# Patient Record
Sex: MALE | Race: WHITE | NOT HISPANIC OR LATINO | URBAN - METROPOLITAN AREA
[De-identification: names, ages, dates, MRNs, and addresses within clinical notes are randomized per-mention and may not be internally consistent; named-entity substitution may affect disease eponyms.]

---

## 2023-04-08 ENCOUNTER — NURSING HOME VISIT (OUTPATIENT)
Dept: POST ACUTE CARE | Facility: EXTERNAL LOCATION | Age: 33
End: 2023-04-08
Payer: COMMERCIAL

## 2023-04-08 DIAGNOSIS — E78.2 DM TYPE 2 WITH DIABETIC MIXED HYPERLIPIDEMIA (MULTI): ICD-10-CM

## 2023-04-08 DIAGNOSIS — N39.0 URINARY TRACT INFECTION ASSOCIATED WITH CATHETERIZATION OF URINARY TRACT, UNSPECIFIED INDWELLING URINARY CATHETER TYPE, SUBSEQUENT ENCOUNTER: ICD-10-CM

## 2023-04-08 DIAGNOSIS — G82.20 PARAPLEGIA (MULTI): Primary | ICD-10-CM

## 2023-04-08 DIAGNOSIS — T83.511D URINARY TRACT INFECTION ASSOCIATED WITH CATHETERIZATION OF URINARY TRACT, UNSPECIFIED INDWELLING URINARY CATHETER TYPE, SUBSEQUENT ENCOUNTER: ICD-10-CM

## 2023-04-08 DIAGNOSIS — I10 DIABETES MELLITUS WITH COINCIDENT HYPERTENSION (MULTI): ICD-10-CM

## 2023-04-08 DIAGNOSIS — K21.9 GERD WITHOUT ESOPHAGITIS: ICD-10-CM

## 2023-04-08 DIAGNOSIS — E11.9 DIABETES MELLITUS WITH COINCIDENT HYPERTENSION (MULTI): ICD-10-CM

## 2023-04-08 DIAGNOSIS — E11.49 DIABETES MELLITUS TYPE 2 WITH NEUROLOGICAL MANIFESTATIONS (MULTI): ICD-10-CM

## 2023-04-08 DIAGNOSIS — F33.41 RECURRENT MAJOR DEPRESSIVE DISORDER, IN PARTIAL REMISSION (CMS-HCC): ICD-10-CM

## 2023-04-08 DIAGNOSIS — E11.69 DM TYPE 2 WITH DIABETIC MIXED HYPERLIPIDEMIA (MULTI): ICD-10-CM

## 2023-04-08 DIAGNOSIS — S24.103S: ICD-10-CM

## 2023-04-08 PROBLEM — T83.511A CATHETER-ASSOCIATED URINARY TRACT INFECTION (CMS-HCC): Status: ACTIVE | Noted: 2023-04-08

## 2023-04-08 PROBLEM — S24.103A: Status: ACTIVE | Noted: 2023-04-08

## 2023-04-08 PROCEDURE — 99309 SBSQ NF CARE MODERATE MDM 30: CPT | Performed by: INTERNAL MEDICINE

## 2023-04-08 NOTE — ASSESSMENT & PLAN NOTE
Diabetes is chronic disease, it does not get cured but it can be controlled, in modern medicine there are variety of measures taken to control DM. Usually we want to preserve beta cell functions. Please understand the disease and how our life style can affect control of glycemia. Diabetes leads to macro and microvascular complications and they could be devastating. It is important to have annual eye check and frequent foot inspection and foot inspection. Kidney dysfunction including dialysis, foot amputations, neuropathy, foot ulcers and accelerated atherosclerotic vascular disease are known complications of uncontrolled DM, pt was educated and explained.

## 2023-04-08 NOTE — ASSESSMENT & PLAN NOTE
PT OT skin care bladder care bowel care GI DVT prophylaxis vaccinations symptom management with the baclofen bladder movement with the Hilario catheter Hilario catheter care UTI with ampicillin probiotics

## 2023-04-08 NOTE — LETTER
Patient: Tolu Levi  : 1990    Encounter Date: 2023    Patient ID: Tolu Levi is a 33 y.o. male who presents for No chief complaint on file..    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 Usually we want to preserve beta cell functions. Please understand the disease and how our life style can affect control of glycemia. Diabetes leads to macro and microvascular complications and they could be devastating. It is important to have annual eye check and frequent foot inspection and foot inspection. Kidney dysfunction including dialysis, foot amputations, neuropathy, foot ulcers and accelerated atherosclerotic vascular disease are known complications of uncontrolled DM, pt was educated and explained.           T7 spinal cord injury (CMS/HCC)      okayVitamin C vitamin D calcium supplement neurosurgery follow-up once a year            Circulatory    Diabetes mellitus with coincident hypertension (CMS/HCC)       Digestive    GERD without esophagitis       Genitourinary    Catheter-associated urinary tract infection (CMS/HCC)       Endocrine/Metabolic    DM type 2 with diabetic mixed hyperlipidemia (CMS/HCC)       Other    Recurrent major depressive disorder, in partial remission (CMS/HCC)     Depression is chronic and quite common and notorious mental health disorder and it is quite common and widespread, there are several therapeutics available for depression now a days. It is considered as chemical imbalance  disorder and with medications , it can be adjusted. There are side effects from SSRI and SNRIs but on long term, they are well tolerated. Please do not feel awkward or shy to contact us if feel tired, sleepy, lack of energy or aloof/ alone. Untreated depression can bring serious consequences including suicidal ideations, mental health counselling is available if need arises. Usually treatment of depression is long lasting therapy with periodic evaluations and follow ups. Pt with depression no longer have to feel frustrated, helpless or isolated.Detailed discussion was carried out.         Rx list reviewed. PT and OT evaluation is in the process. Routine safety measures, fall precautions, risk modification and alarm placement if needed for prevention of falls. Skin care precautions, prevention of pressures sores at pressure points assessed. Pt needs to be monitored frequently by nursing staff particularly at night time. Any confusion, agitation or behavioural disturbance needs to be attended, as per home policy rapid covid Ag assay need to be done, notify if positive. If needed appropriate measures to be taken for alarm placements and assisted devices, pt was told not to get up and ambulate at night unless help and assist available at bedside, labs will be done as per our routine protocol. PO intake need to be monitored if consuming po.     Source of history: Nurse, Medical personnel, Medical record, Patient.  History limitation: None.      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 Click to view details.     Bisacodyl Suppository 10 MG Active 11/17/2021   This order is potentially duplicated by other orders on the chart. Click to view details.    Sennosides Tablet 8.6 MG Active 3/20/2022   This order is potentially duplicated by other orders on the chart. Click to view details.    Sodium Phosphates Enema Active 11/17/2021   This order is potentially duplicated by other orders on the chart. Click to view details.    Magnesium Hydroxide Suspension 400 MG/5ML Active 11/17/2021     Tetrahydrozoline HCl Solution 0.05 % Active 1/27/2022   This order is potentially duplicated by other orders on the chart. Click to view details.    Ibuprofen Tablet 200 MG Active 4/23/2022 4/4/2022    Narcan Liquid (Naloxone HCl) Active 4/11/2022 4/11/2022    Benzocaine Gel 20 % Active 4/18/2022 4/18/2022    guaiFENesin ER Tablet Extended Release 12 Hour 600 MG Active 7/5/2022     Docusate Sodium Capsule 100 MG Active 7/5/2022     Fish Oil Capsule 500 MG (Omega-3 Fatty Acids) Active 7/6/2022     Metoprolol Tartrate Tablet 50 MG Active 3/27/2023 3/27/2023    Norvasc Tablet 5 MG (amLODIPine Besylate) Active 4/7/2023 4/7/2023    DULoxetine HCl Capsule Delayed Release Particles 30 MG Active 3/16/2023 3/16/2023    Gabapentin Tablet 800 MG Active 3/13/2023 3/13/2023    Glucophage Tablet 1000 MG (metFORMIN HCl) Active 3/21/2023 3/21/2023    glipiZIDE Tablet 5 MG Active 3/31/2023 3/31/2023    Insulin Glargine-yfgn Subcutaneous Solution Pen-injector 100 UNIT/ML (Insulin Glargine-yfgn) Active 3/31/2023 3/31/2023    Calcium Carbonate Tablet Chewable 500 MG Active 11/2/2022 11/2/2022    Omeprazole Oral Capsule Delayed Release 20 MG (Omeprazole) Active 3/30/2023 3/30/2023  This order is potentially duplicated by other orders on the chart. Click to view details.    traMADol HCl Oral Tablet 50 MG (Tramadol HCl) Active 2/24/2023 2/24/2023    Lisinopril Oral Tablet 20 MG (Lisinopril) Active 3/1/2023 3/1/2023    Ferrous Sulfate Oral Tablet 325 (65 Fe) MG (Ferrous  Sulfate) Active 1/11/2023     Baclofen Oral Tablet 5 MG (Baclofen) Active 3/13/2023 3/13/2023    Ampicillin Oral Capsule 500 MG (Ampicillin) Active 2/1/2023 2/1/2023    Multi Vitamin/Minerals Oral Tablet (Multiple Vitamins w/ Minerals) Active 2/18/2023         Objective    Current Vitals   BP: 138/77 mmHg  4/4/2023 14:36  Temp:97.4 °F  4/4/2023 14:36  Pulse:86 bpm  4/4/2023 14:36    Weight:282.5 Lbs  3/17/2023 14:39  Resp:18 Breaths/min  4/4/2023 14:36  BS:353 mg/dL  4/8/2023 08:30  O2:97 %  4/4/2023 14:36  Pain:5  4/8/2023 10:33  PHYSICAL EXAM  General: NAD. NCAT. Aox3   HEENT: PERRLA. EOMI. MMM. Nares patent bl.  Cardiovascular: RRR. No MRG. S1/S2 wnl.   Respiratory: Crackles.   GI: Bowel dysfunction   : Bladder dysfunction Hilario catheter   MSK: Paraplegia from thoracis   extremities: Paraplegia   skin: No rashes or bruises.   Neuro: Thoracolumbar radiculopathy muscle spasm  Psych: Mood wnl.      Physical Exam  Cardiovascular:      Pulses:           Dorsalis pedis pulses are 2+ on the right side and 2+ on the left side.        Posterior tibial pulses are 2+ on the right side and 2+ on the left side.   Musculoskeletal:      Right foot: No deformity.      Left foot: No deformity.   Feet:      Right foot:      Protective Sensation: 5 sites tested.        Skin integrity: Skin integrity normal.      Toenail Condition: Right toenails are normal.      Left foot:      Protective Sensation: 5 sites tested.        Skin integrity: Skin integrity normal.      Toenail Condition: Left toenails are normal.         ROS  Constitutional: Denies fevers, chills, fatigue, weight loss/gain  HEENT: Denies HA, vision changes, hearing loss, sore throat  Cardiac: Denies CP, palpitations, edema  Respiratory: Denies SOB, cough, pleuritic chest pain, PND, orthopnea  GI: Denies N/V/D, abd pain, constipation, black/bloody stools  : Denies urinary changes, frequency, hematuria, urgency, retention, flank pain  MSK: Denies joint pain, joint  swelling, back pain, neck pain, extremity pain  Neuro: Denies numbness, weakness, tingling        No visits with results within 4 Month(s) from this visit.   Latest known visit with results is:   Legacy Encounter on 10/24/2022   Component Date Value Ref Range Status   • Color, Urine 10/24/2022 YELLOW  STRAW,YELLOW Final   • Appearance, Urine 10/24/2022 HAZY  CLEAR Final   • Specific Gravity, Urine 10/24/2022 1.016  1.005 - 1.035 Final   • pH, Urine 10/24/2022 6.0  5.0 - 8.0 Final   • Protein, Urine 10/24/2022 NEGATIVE  NEGATIVE mg/dL Final   • Glucose, Urine 10/24/2022 >=500 (3+) (A)  NEGATIVE mg/dL Final   • Blood, Urine 10/24/2022 NEGATIVE  NEGATIVE Final   • Ketones, Urine 10/24/2022 NEGATIVE  NEGATIVE mg/dL Final   • Bilirubin, Urine 10/24/2022 NEGATIVE  NEGATIVE Final   • Urobilinogen, Urine 10/24/2022 <2.0  0.0 - 1.9 mg/dL Final   • Nitrite, Urine 10/24/2022 POSITIVE (A)  NEGATIVE Final   • Leukocyte Esterase, Urine 10/24/2022 SMALL (1+) (A)  NEGATIVE Final   • WBC, Urine 10/24/2022 23 (A)  0 - 5 /HPF Final   • RBC, Urine 10/24/2022 1  0 - 5 /HPF Final   • Squamous Epithelial Cells, Urine 10/24/2022 <1  /HPF Final   • Mucus, Urine 10/24/2022 1+  /LPF Final       Radiology: Reviewed imaging in powerchart.  No results found.    No family history on file.  Social History     Socioeconomic History   • Marital status: Single     Spouse name: None   • Number of children: None   • Years of education: None   • Highest education level: None   Occupational History   • None   Tobacco Use   • Smoking status: Former     Types: Cigarettes   • Smokeless tobacco: Never   Vaping Use   • Vaping status: None   Substance and Sexual Activity   • Alcohol use: Not Currently   • Drug use: Not Currently   • Sexual activity: None   Other Topics Concern   • None   Social History Narrative   • None     Social Determinants of Health     Financial Resource Strain: Not on file   Food Insecurity: Not on file   Transportation Needs: Not on  file   Physical Activity: Not on file   Stress: Not on file   Social Connections: Not on file   Intimate Partner Violence: Not on file   Housing Stability: Not on file     History reviewed. No pertinent past medical history.  History reviewed. No pertinent surgical history.  * Cannot find OR log *    Charting was completed using voice recognition technology and may include unintended errors.           Electronically Signed By: Lam Cook MD   4/8/23  1:25 PM

## 2023-04-08 NOTE — PROGRESS NOTES
Patient ID: Tolu Levi is a 33 y.o. male who presents for No chief complaint on file..    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 Usually we want to preserve beta cell functions. Please understand the disease and how our life style can affect control of glycemia. Diabetes leads to macro and microvascular complications and they could be devastating. It is important to have annual eye check and frequent foot inspection and foot inspection. Kidney dysfunction including dialysis, foot amputations, neuropathy, foot ulcers and accelerated atherosclerotic vascular disease are known complications of uncontrolled DM, pt was educated and explained.           T7 spinal cord injury (CMS/HCC)      okayVitamin C vitamin D calcium supplement neurosurgery follow-up once a year            Circulatory    Diabetes mellitus with coincident hypertension (CMS/HCC)       Digestive    GERD without esophagitis       Genitourinary    Catheter-associated urinary tract infection (CMS/HCC)       Endocrine/Metabolic    DM type 2 with diabetic mixed hyperlipidemia (CMS/HCC)       Other    Recurrent major depressive disorder, in partial remission (CMS/HCC)     Depression is chronic and quite common and notorious mental health disorder and it is quite common and widespread, there are several therapeutics available for depression now a days. It is considered as chemical imbalance disorder and with medications , it can be adjusted. There are side effects  from SSRI and SNRIs but on long term, they are well tolerated. Please do not feel awkward or shy to contact us if feel tired, sleepy, lack of energy or aloof/ alone. Untreated depression can bring serious consequences including suicidal ideations, mental health counselling is available if need arises. Usually treatment of depression is long lasting therapy with periodic evaluations and follow ups. Pt with depression no longer have to feel frustrated, helpless or isolated.Detailed discussion was carried out.         Rx list reviewed. PT and OT evaluation is in the process. Routine safety measures, fall precautions, risk modification and alarm placement if needed for prevention of falls. Skin care precautions, prevention of pressures sores at pressure points assessed. Pt needs to be monitored frequently by nursing staff particularly at night time. Any confusion, agitation or behavioural disturbance needs to be attended, as per home policy rapid covid Ag assay need to be done, notify if positive. If needed appropriate measures to be taken for alarm placements and assisted devices, pt was told not to get up and ambulate at night unless help and assist available at bedside, labs will be done as per our routine protocol. PO intake need to be monitored if consuming po.   This patient was seen for my regular monthly visit, nursing evaluations and nursing notes were reviewed, interim events are reviewed, interim concerns and messages were reviewed as we have communicated with nursing staff.  Any issues with the falls, skin care impairment, declining physical condition are reviewed and noted, diagnosis list were reviewed, list of medications were reviewed, living will related issues were reviewed, overall patient has been doing well, any declining in patient's condition or any change in patient's condition needs to be notified to physician promptly, discussed with nursing staff, if needed would communicate with family.   Patient stays confined here at the facility for long-term care, there are always concerns about long-term care related issues and concerns.  Nursing staff is trying their best to keep patient safe, all sort of measures has been taken to keep patient safe and comfortable.     Source of history: Nurse, Medical personnel, Medical record, Patient.  History limitation: None.      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 Click to view details.    Bisacodyl Suppository 10 MG Active 11/17/2021   This order is potentially duplicated by other orders on the chart. Click to view details.    Sennosides Tablet 8.6 MG Active 3/20/2022   This order is potentially duplicated by other orders on the chart. Click to view details.    Sodium Phosphates Enema Active 11/17/2021   This order is potentially duplicated by other orders on the chart. Click to view details.    Magnesium Hydroxide Suspension 400 MG/5ML Active 11/17/2021     Tetrahydrozoline HCl Solution 0.05 % Active 1/27/2022   This order is potentially duplicated by other orders on the chart. Click to view details.    Ibuprofen Tablet 200 MG Active 4/23/2022 4/4/2022    Narcan Liquid (Naloxone HCl) Active 4/11/2022 4/11/2022    Benzocaine Gel 20 % Active 4/18/2022 4/18/2022    guaiFENesin ER Tablet Extended Release 12 Hour 600 MG Active 7/5/2022     Docusate Sodium Capsule 100 MG Active 7/5/2022     Fish Oil Capsule 500 MG (Omega-3 Fatty Acids) Active 7/6/2022     Metoprolol Tartrate Tablet 50 MG Active 3/27/2023 3/27/2023    Norvasc Tablet 5 MG (amLODIPine  Besylate) Active 4/7/2023 4/7/2023    DULoxetine HCl Capsule Delayed Release Particles 30 MG Active 3/16/2023 3/16/2023    Gabapentin Tablet 800 MG Active 3/13/2023 3/13/2023    Glucophage Tablet 1000 MG (metFORMIN HCl) Active 3/21/2023 3/21/2023    glipiZIDE Tablet 5 MG Active 3/31/2023 3/31/2023    Insulin Glargine-yfgn Subcutaneous Solution Pen-injector 100 UNIT/ML (Insulin Glargine-yfgn) Active 3/31/2023 3/31/2023    Calcium Carbonate Tablet Chewable 500 MG Active 11/2/2022 11/2/2022    Omeprazole Oral Capsule Delayed Release 20 MG (Omeprazole) Active 3/30/2023 3/30/2023  This order is potentially duplicated by other orders on the chart. Click to view details.    traMADol HCl Oral Tablet 50 MG (Tramadol HCl) Active 2/24/2023 2/24/2023    Lisinopril Oral Tablet 20 MG (Lisinopril) Active 3/1/2023 3/1/2023    Ferrous Sulfate Oral Tablet 325 (65 Fe) MG (Ferrous Sulfate) Active 1/11/2023     Baclofen Oral Tablet 5 MG (Baclofen) Active 3/13/2023 3/13/2023    Ampicillin Oral Capsule 500 MG (Ampicillin) Active 2/1/2023 2/1/2023    Multi Vitamin/Minerals Oral Tablet (Multiple Vitamins w/ Minerals) Active 2/18/2023         Objective     Current Vitals   BP: 138/77 mmHg  4/4/2023 14:36  Temp:97.4 °F  4/4/2023 14:36  Pulse:86 bpm  4/4/2023 14:36    Weight:282.5 Lbs  3/17/2023 14:39  Resp:18 Breaths/min  4/4/2023 14:36  BS:353 mg/dL  4/8/2023 08:30  O2:97 %  4/4/2023 14:36  Pain:5  4/8/2023 10:33  PHYSICAL EXAM  General: NAD. NCAT. Aox3   HEENT: PERRLA. EOMI. MMM. Nares patent bl.  Cardiovascular: RRR. No MRG. S1/S2 wnl.   Respiratory: Crackles.   GI: Bowel dysfunction   : Bladder dysfunction Hilario catheter   MSK: Paraplegia from thoracis   extremities: Paraplegia   skin: No rashes or bruises.   Neuro: Thoracolumbar radiculopathy muscle spasm  Psych: Mood wnl.      Physical Exam  Cardiovascular:      Pulses:           Dorsalis pedis pulses are 2+ on the right side and 2+ on the left side.        Posterior tibial pulses are  2+ on the right side and 2+ on the left side.   Musculoskeletal:      Right foot: No deformity.      Left foot: No deformity.   Feet:      Right foot:      Protective Sensation: 5 sites tested.        Skin integrity: Skin integrity normal.      Toenail Condition: Right toenails are normal.      Left foot:      Protective Sensation: 5 sites tested.        Skin integrity: Skin integrity normal.      Toenail Condition: Left toenails are normal.         ROS  Constitutional: Denies fevers, chills, fatigue, weight loss/gain  HEENT: Denies HA, vision changes, hearing loss, sore throat  Cardiac: Denies CP, palpitations, edema  Respiratory: Denies SOB, cough, pleuritic chest pain, PND, orthopnea  GI: Denies N/V/D, abd pain, constipation, black/bloody stools  : Denies urinary changes, frequency, hematuria, urgency, retention, flank pain  MSK: Denies joint pain, joint swelling, back pain, neck pain, extremity pain  Neuro: Denies numbness, weakness, tingling        No visits with results within 4 Month(s) from this visit.   Latest known visit with results is:   Legacy Encounter on 10/24/2022   Component Date Value Ref Range Status    Color, Urine 10/24/2022 YELLOW  STRAW,YELLOW Final    Appearance, Urine 10/24/2022 HAZY  CLEAR Final    Specific Gravity, Urine 10/24/2022 1.016  1.005 - 1.035 Final    pH, Urine 10/24/2022 6.0  5.0 - 8.0 Final    Protein, Urine 10/24/2022 NEGATIVE  NEGATIVE mg/dL Final    Glucose, Urine 10/24/2022 >=500 (3+) (A)  NEGATIVE mg/dL Final    Blood, Urine 10/24/2022 NEGATIVE  NEGATIVE Final    Ketones, Urine 10/24/2022 NEGATIVE  NEGATIVE mg/dL Final    Bilirubin, Urine 10/24/2022 NEGATIVE  NEGATIVE Final    Urobilinogen, Urine 10/24/2022 <2.0  0.0 - 1.9 mg/dL Final    Nitrite, Urine 10/24/2022 POSITIVE (A)  NEGATIVE Final    Leukocyte Esterase, Urine 10/24/2022 SMALL (1+) (A)  NEGATIVE Final    WBC, Urine 10/24/2022 23 (A)  0 - 5 /HPF Final    RBC, Urine 10/24/2022 1  0 - 5 /HPF Final    Squamous  Epithelial Cells, Urine 10/24/2022 <1  /HPF Final    Mucus, Urine 10/24/2022 1+  /LPF Final       Radiology: Reviewed imaging in powerchart.  No results found.    No family history on file.  Social History     Socioeconomic History    Marital status: Single     Spouse name: None    Number of children: None    Years of education: None    Highest education level: None   Occupational History    None   Tobacco Use    Smoking status: Former     Types: Cigarettes    Smokeless tobacco: Never   Vaping Use    Vaping status: None   Substance and Sexual Activity    Alcohol use: Not Currently    Drug use: Not Currently    Sexual activity: None   Other Topics Concern    None   Social History Narrative    None     Social Determinants of Health     Financial Resource Strain: Not on file   Food Insecurity: Not on file   Transportation Needs: Not on file   Physical Activity: Not on file   Stress: Not on file   Social Connections: Not on file   Intimate Partner Violence: Not on file   Housing Stability: Not on file     History reviewed. No pertinent past medical history.  History reviewed. No pertinent surgical history.  * Cannot find OR log *    Charting was completed using voice recognition technology and may include unintended errors.

## 2023-05-07 ENCOUNTER — NURSING HOME VISIT (OUTPATIENT)
Dept: POST ACUTE CARE | Facility: EXTERNAL LOCATION | Age: 33
End: 2023-05-07
Payer: COMMERCIAL

## 2023-05-07 DIAGNOSIS — S24.103D: ICD-10-CM

## 2023-05-07 DIAGNOSIS — N39.0 URINARY TRACT INFECTION ASSOCIATED WITH INDWELLING URETHRAL CATHETER, SUBSEQUENT ENCOUNTER: Primary | ICD-10-CM

## 2023-05-07 DIAGNOSIS — T83.511D URINARY TRACT INFECTION ASSOCIATED WITH INDWELLING URETHRAL CATHETER, SUBSEQUENT ENCOUNTER: Primary | ICD-10-CM

## 2023-05-07 DIAGNOSIS — G82.20 PARAPLEGIA (MULTI): ICD-10-CM

## 2023-05-07 DIAGNOSIS — F33.41 RECURRENT MAJOR DEPRESSIVE DISORDER, IN PARTIAL REMISSION (CMS-HCC): ICD-10-CM

## 2023-05-07 DIAGNOSIS — S80.851D: ICD-10-CM

## 2023-05-07 PROBLEM — S80.851A: Status: ACTIVE | Noted: 2023-05-07

## 2023-05-07 PROBLEM — N30.00 ACUTE CYSTITIS WITHOUT HEMATURIA: Status: ACTIVE | Noted: 2023-05-07

## 2023-05-07 PROCEDURE — 99309 SBSQ NF CARE MODERATE MDM 30: CPT | Performed by: INTERNAL MEDICINE

## 2023-05-07 NOTE — LETTER
Patient: Tolu Levi  : 1990    Encounter Date: 2023    Patient ID: Tolu Levi is a 33 y.o. male who presents for Right lower extremity wound  WOUND: RLE: Cleanse area with NS. Apply hydrogel and cover with jamee alg. Apply bordergauze to area. Change Qd and PRN   every night shift for wound care  Other Active 2023 19:00  2023  Bactrim DS Oral Tablet 800-160 MG (Sulfamethoxazole-    UTI  mpicillin Oral Capsule 500 MG (Ampicillin) every 6 with probiotic repeat CBC BMP UA CNS PSAAssessment/Plan    Problem List Items Addressed This Visit          Nervous    Paraplegia (CMS/HCC)     Physical therapy Occupational Therapy skin care bladder bowel care baclofen         T7 spinal cord injury (CMS/HCC)     Vitamin C vitamin D neurosurgery eval x-ray of the spine            Genitourinary    Catheter-associated urinary tract infection (CMS/HCC) - Primary     Amoxicillin probiotics check UA CNS PSA urology evaluation cystoscopy if problems continue            Other    Recurrent major depressive disorder, in partial remission (CMS/HCC)     Depression is chronic and quite common and notorious mental health disorder and it is quite common and widespread, there are several therapeutics available for depression now a days. It is considered as chemical imbalance disorder and with medications , it can be adjusted. There are side effects from SSRI and SNRIs but on long term, they are well tolerated. Please do not feel awkward or shy to contact us if feel tired, sleepy, lack of energy or aloof/ alone. Untreated depression can bring serious consequences including suicidal ideations, mental health counselling is available if need arises. Usually treatment of depression is long lasting therapy with periodic evaluations and follow ups. Pt with depression no longer have to feel frustrated, helpless or isolated.Detailed discussion was carried out.           Superficial foreign body of right leg without major open wound  and without infection     wound care evaluation        This patient was seen for my regular monthly visit, nursing evaluations and nursing notes were reviewed, interim events are reviewed, interim concerns and messages were reviewed as we have communicated with nursing staff.  Any issues with the falls, skin care impairment, declining physical condition are reviewed and noted, diagnosis list were reviewed, list of medications were reviewed, living will related issues were reviewed, overall patient has been doing well, any declining in patient's condition or any change in patient's condition needs to be notified to physician promptly, discussed with nursing staff, if needed would communicate with family.  Patient stays confined here at the facility for long-term care, there are always concerns about long-term care related issues and concerns.  Nursing staff is trying their best to keep patient safe, all sort of measures has been taken to keep patient safe and comfortable.     Source of history: Nurse, Medical personnel, Medical record, Patient.  History limitation: None.      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 Click to view details.    Bisacodyl Suppository 10 MG Active 11/17/2021   This order is potentially duplicated by other orders on the chart. Click to view details.    Sennosides Tablet 8.6 MG Active 3/20/2022   This order is potentially duplicated by other orders on the chart. Click to view details.    Sodium Phosphates Enema Active 11/17/2021   This order is potentially duplicated by other  orders on the chart. Click to view details.    Magnesium Hydroxide Suspension 400 MG/5ML Active 11/17/2021     Tetrahydrozoline HCl Solution 0.05 % Active 1/27/2022   This order is potentially duplicated by other orders on the chart. Click to view details.    Ibuprofen Tablet 200 MG Active 4/23/2022 4/4/2022    Narcan Liquid (Naloxone HCl) Active 4/11/2022 4/11/2022    Benzocaine Gel 20 % Active 4/18/2022 4/18/2022    guaiFENesin ER Tablet Extended Release 12 Hour 600 MG Active 7/5/2022     Docusate Sodium Capsule 100 MG Active 7/5/2022     Fish Oil Capsule 500 MG (Omega-3 Fatty Acids) Active 7/6/2022     Metoprolol Tartrate Tablet 50 MG Active 3/27/2023 3/27/2023    Norvasc Tablet 5 MG (amLODIPine Besylate) Active 4/7/2023 4/7/2023    DULoxetine HCl Capsule Delayed Release Particles 30 MG Active 3/16/2023 3/16/2023    Gabapentin Tablet 800 MG Active 3/13/2023 3/13/2023    Glucophage Tablet 1000 MG (metFORMIN HCl) Active 3/21/2023 3/21/2023    glipiZIDE Tablet 5 MG Active 3/31/2023 3/31/2023    Insulin Glargine-yfgn Subcutaneous Solution Pen-injector 100 UNIT/ML (Insulin Glargine-yfgn) Active 3/31/2023 3/31/2023    Calcium Carbonate Tablet Chewable 500 MG Active 11/2/2022 11/2/2022    Omeprazole Oral Capsule Delayed Release 20 MG (Omeprazole) Active 3/30/2023 3/30/2023  This order is potentially duplicated by other orders on the chart. Click to view details.    traMADol HCl Oral Tablet 50 MG (Tramadol HCl) Active 2/24/2023 2/24/2023    Lisinopril Oral Tablet 20 MG (Lisinopril) Active 3/1/2023 3/1/2023    Ferrous Sulfate Oral Tablet 325 (65 Fe) MG (Ferrous Sulfate) Active 1/11/2023     Baclofen Oral Tablet 5 MG (Baclofen) Active 3/13/2023 3/13/2023    Ampicillin Oral Capsule 500 MG (Ampicillin) Active 2/1/2023 2/1/2023    Multi Vitamin/Minerals Oral Tablet (Multiple Vitamins w/ Minerals) Active 2/18/2023         Objective    Current Vitals   BP: 138/77 mmHg  4/4/2023 14:36  Temp:97.4 °F  4/4/2023 14:36  Pulse:86  bpm  4/4/2023 14:36    Weight:301 Lbs  5/2/2023 12:37  Resp:18 Breaths/min  4/4/2023 14:36  BS:412 mg/dL  5/7/2023 08:39  O2:97 %  4/4/2023 14:36  Pain:5  5/7/2023 08:43  PHYSICAL EXAM  General: NAD. NCAT. Aox3   HEENT: PERRLA. EOMI. MMM. Nares patent bl.  Cardiovascular: RRR. No MRG. S1/S2 wnl.   Respiratory: Crackles.   GI: Bowel dysfunction   : Bladder dysfunction Hilario catheter   MSK: Paraplegia from thoracis   extremities: Paraplegia   skin: Pressure ulcer woundNeuro: Thoracolumbar radiculopathy muscle spasm  Psych: Mood wnl.      Physical Exam  Cardiovascular:      Pulses:           Dorsalis pedis pulses are 2+ on the right side and 2+ on the left side.        Posterior tibial pulses are 2+ on the right side and 2+ on the left side.   Musculoskeletal:      Right foot: No deformity.      Left foot: No deformity.   Feet:      Right foot:      Protective Sensation: 5 sites tested.        Skin integrity: Skin integrity normal.      Toenail Condition: Right toenails are normal.      Left foot:      Protective Sensation: 5 sites tested.        Skin integrity: Skin integrity normal.      Toenail Condition: Left toenails are normal.                 No visits with results within 4 Month(s) from this visit.   Latest known visit with results is:   Legacy Encounter on 10/24/2022   Component Date Value Ref Range Status   • Color, Urine 10/24/2022 YELLOW  STRAW,YELLOW Final   • Appearance, Urine 10/24/2022 HAZY  CLEAR Final   • Specific Gravity, Urine 10/24/2022 1.016  1.005 - 1.035 Final   • pH, Urine 10/24/2022 6.0  5.0 - 8.0 Final   • Protein, Urine 10/24/2022 NEGATIVE  NEGATIVE mg/dL Final   • Glucose, Urine 10/24/2022 >=500 (3+) (A)  NEGATIVE mg/dL Final   • Blood, Urine 10/24/2022 NEGATIVE  NEGATIVE Final   • Ketones, Urine 10/24/2022 NEGATIVE  NEGATIVE mg/dL Final   • Bilirubin, Urine 10/24/2022 NEGATIVE  NEGATIVE Final   • Urobilinogen, Urine 10/24/2022 <2.0  0.0 - 1.9 mg/dL Final   • Nitrite, Urine 10/24/2022  POSITIVE (A)  NEGATIVE Final   • Leukocyte Esterase, Urine 10/24/2022 SMALL (1+) (A)  NEGATIVE Final   • WBC, Urine 10/24/2022 23 (A)  0 - 5 /HPF Final   • RBC, Urine 10/24/2022 1  0 - 5 /HPF Final   • Squamous Epithelial Cells, Urine 10/24/2022 <1  /HPF Final   • Mucus, Urine 10/24/2022 1+  /LPF Final       Radiology: Reviewed imaging in powerchart.  No results found.    No family history on file.  Social History     Socioeconomic History   • Marital status: Single     Spouse name: None   • Number of children: None   • Years of education: None   • Highest education level: None   Occupational History   • None   Tobacco Use   • Smoking status: Former     Types: Cigarettes   • Smokeless tobacco: Never   Vaping Use   • Vaping status: None   Substance and Sexual Activity   • Alcohol use: Not Currently   • Drug use: Not Currently   • Sexual activity: None   Other Topics Concern   • None   Social History Narrative   • None     Social Determinants of Health     Financial Resource Strain: Not on file   Food Insecurity: Not on file   Transportation Needs: Not on file   Physical Activity: Not on file   Stress: Not on file   Social Connections: Not on file   Intimate Partner Violence: Not on file   Housing Stability: Not on file     History reviewed. No pertinent past medical history.  History reviewed. No pertinent surgical history.  * Cannot find OR log *    Charting was completed using voice recognition technology and may include unintended errors.           Electronically Signed By: Lam Cook MD   5/7/23  4:20 PM

## 2023-05-07 NOTE — ASSESSMENT & PLAN NOTE
Depression is chronic and quite common and notorious mental health disorder and it is quite common and widespread, there are several therapeutics available for depression now a days. It is considered as chemical imbalance disorder and with medications , it can be adjusted. There are side effects from SSRI and SNRIs but on long term, they are well tolerated. Please do not feel awkward or shy to contact us if feel tired, sleepy, lack of energy or aloof/ alone. Untreated depression can bring serious consequences including suicidal ideations, mental health counselling is available if need arises. Usually treatment of depression is long lasting therapy with periodic evaluations and follow ups. Pt with depression no longer have to feel frustrated, helpless or isolated.Detailed discussion was carried out.

## 2023-05-07 NOTE — PROGRESS NOTES
Patient ID: Tolu Levi is a 33 y.o. male who presents for Right lower extremity wound  WOUND: RLE: Cleanse area with NS. Apply hydrogel and cover with jamee alg. Apply bordergauze to area. Change Qd and PRN   every night shift for wound care  Other Active 5/5/2023 19:00  5/5/2023  Bactrim DS Oral Tablet 800-160 MG (Sulfamethoxazole-    UTI  mpicillin Oral Capsule 500 MG (Ampicillin) every 6 with probiotic repeat CBC BMP UA CNS PSAAssessment/Plan     Problem List Items Addressed This Visit          Nervous    Paraplegia (CMS/HCC)     Physical therapy Occupational Therapy skin care bladder bowel care baclofen         T7 spinal cord injury (CMS/HCC)     Vitamin C vitamin D neurosurgery eval x-ray of the spine            Genitourinary    Catheter-associated urinary tract infection (CMS/HCC) - Primary     Amoxicillin probiotics check UA CNS PSA urology evaluation cystoscopy if problems continue            Other    Recurrent major depressive disorder, in partial remission (CMS/HCC)     Depression is chronic and quite common and notorious mental health disorder and it is quite common and widespread, there are several therapeutics available for depression now a days. It is considered as chemical imbalance disorder and with medications , it can be adjusted. There are side effects from SSRI and SNRIs but on long term, they are well tolerated. Please do not feel awkward or shy to contact us if feel tired, sleepy, lack of energy or aloof/ alone. Untreated depression can bring serious consequences including suicidal ideations, mental health counselling is available if need arises. Usually treatment of depression is long lasting therapy with periodic evaluations and follow ups. Pt with depression no longer have to feel frustrated, helpless or isolated.Detailed discussion was carried out.           Superficial foreign body of right leg without major open wound and without infection     wound care evaluation        This patient  was seen for my regular monthly visit, nursing evaluations and nursing notes were reviewed, interim events are reviewed, interim concerns and messages were reviewed as we have communicated with nursing staff.  Any issues with the falls, skin care impairment, declining physical condition are reviewed and noted, diagnosis list were reviewed, list of medications were reviewed, living will related issues were reviewed, overall patient has been doing well, any declining in patient's condition or any change in patient's condition needs to be notified to physician promptly, discussed with nursing staff, if needed would communicate with family.  Patient stays confined here at the facility for long-term care, there are always concerns about long-term care related issues and concerns.  Nursing staff is trying their best to keep patient safe, all sort of measures has been taken to keep patient safe and comfortable.     Source of history: Nurse, Medical personnel, Medical record, Patient.  History limitation: None.      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 Click to view details.    Bisacodyl Suppository 10 MG Active 11/17/2021   This order is potentially duplicated by other orders on the chart. Click to view details.    Sennosides Tablet 8.6 MG Active 3/20/2022   This order is potentially duplicated by other orders on the chart. Click to view details.    Sodium Phosphates Enema Active 11/17/2021   This order is potentially duplicated by other orders on the chart. Click to view details.    Magnesium Hydroxide  Suspension 400 MG/5ML Active 11/17/2021     Tetrahydrozoline HCl Solution 0.05 % Active 1/27/2022   This order is potentially duplicated by other orders on the chart. Click to view details.    Ibuprofen Tablet 200 MG Active 4/23/2022 4/4/2022    Narcan Liquid (Naloxone HCl) Active 4/11/2022 4/11/2022    Benzocaine Gel 20 % Active 4/18/2022 4/18/2022    guaiFENesin ER Tablet Extended Release 12 Hour 600 MG Active 7/5/2022     Docusate Sodium Capsule 100 MG Active 7/5/2022     Fish Oil Capsule 500 MG (Omega-3 Fatty Acids) Active 7/6/2022     Metoprolol Tartrate Tablet 50 MG Active 3/27/2023 3/27/2023    Norvasc Tablet 5 MG (amLODIPine Besylate) Active 4/7/2023 4/7/2023    DULoxetine HCl Capsule Delayed Release Particles 30 MG Active 3/16/2023 3/16/2023    Gabapentin Tablet 800 MG Active 3/13/2023 3/13/2023    Glucophage Tablet 1000 MG (metFORMIN HCl) Active 3/21/2023 3/21/2023    glipiZIDE Tablet 5 MG Active 3/31/2023 3/31/2023    Insulin Glargine-yfgn Subcutaneous Solution Pen-injector 100 UNIT/ML (Insulin Glargine-yfgn) Active 3/31/2023 3/31/2023    Calcium Carbonate Tablet Chewable 500 MG Active 11/2/2022 11/2/2022    Omeprazole Oral Capsule Delayed Release 20 MG (Omeprazole) Active 3/30/2023 3/30/2023  This order is potentially duplicated by other orders on the chart. Click to view details.    traMADol HCl Oral Tablet 50 MG (Tramadol HCl) Active 2/24/2023 2/24/2023    Lisinopril Oral Tablet 20 MG (Lisinopril) Active 3/1/2023 3/1/2023    Ferrous Sulfate Oral Tablet 325 (65 Fe) MG (Ferrous Sulfate) Active 1/11/2023     Baclofen Oral Tablet 5 MG (Baclofen) Active 3/13/2023 3/13/2023    Ampicillin Oral Capsule 500 MG (Ampicillin) Active 2/1/2023 2/1/2023    Multi Vitamin/Minerals Oral Tablet (Multiple Vitamins w/ Minerals) Active 2/18/2023         Objective     Current Vitals   BP: 138/77 mmHg  4/4/2023 14:36  Temp:97.4 °F  4/4/2023 14:36  Pulse:86 bpm  4/4/2023 14:36    Weight:301 Lbs  5/2/2023 12:37  Resp:18  Breaths/min  4/4/2023 14:36  BS:412 mg/dL  5/7/2023 08:39  O2:97 %  4/4/2023 14:36  Pain:5  5/7/2023 08:43  PHYSICAL EXAM  General: NAD. NCAT. Aox3   HEENT: PERRLA. EOMI. MMM. Nares patent bl.  Cardiovascular: RRR. No MRG. S1/S2 wnl.   Respiratory: Crackles.   GI: Bowel dysfunction   : Bladder dysfunction Hilario catheter   MSK: Paraplegia from thoracis   extremities: Paraplegia   skin: Pressure ulcer woundNeuro: Thoracolumbar radiculopathy muscle spasm  Psych: Mood wnl.      Physical Exam  Cardiovascular:      Pulses:           Dorsalis pedis pulses are 2+ on the right side and 2+ on the left side.        Posterior tibial pulses are 2+ on the right side and 2+ on the left side.   Musculoskeletal:      Right foot: No deformity.      Left foot: No deformity.   Feet:      Right foot:      Protective Sensation: 5 sites tested.        Skin integrity: Skin integrity normal.      Toenail Condition: Right toenails are normal.      Left foot:      Protective Sensation: 5 sites tested.        Skin integrity: Skin integrity normal.      Toenail Condition: Left toenails are normal.                 No visits with results within 4 Month(s) from this visit.   Latest known visit with results is:   Legacy Encounter on 10/24/2022   Component Date Value Ref Range Status    Color, Urine 10/24/2022 YELLOW  STRAW,YELLOW Final    Appearance, Urine 10/24/2022 HAZY  CLEAR Final    Specific Gravity, Urine 10/24/2022 1.016  1.005 - 1.035 Final    pH, Urine 10/24/2022 6.0  5.0 - 8.0 Final    Protein, Urine 10/24/2022 NEGATIVE  NEGATIVE mg/dL Final    Glucose, Urine 10/24/2022 >=500 (3+) (A)  NEGATIVE mg/dL Final    Blood, Urine 10/24/2022 NEGATIVE  NEGATIVE Final    Ketones, Urine 10/24/2022 NEGATIVE  NEGATIVE mg/dL Final    Bilirubin, Urine 10/24/2022 NEGATIVE  NEGATIVE Final    Urobilinogen, Urine 10/24/2022 <2.0  0.0 - 1.9 mg/dL Final    Nitrite, Urine 10/24/2022 POSITIVE (A)  NEGATIVE Final    Leukocyte Esterase, Urine 10/24/2022 SMALL  (1+) (A)  NEGATIVE Final    WBC, Urine 10/24/2022 23 (A)  0 - 5 /HPF Final    RBC, Urine 10/24/2022 1  0 - 5 /HPF Final    Squamous Epithelial Cells, Urine 10/24/2022 <1  /HPF Final    Mucus, Urine 10/24/2022 1+  /LPF Final       Radiology: Reviewed imaging in powerchart.  No results found.    No family history on file.  Social History     Socioeconomic History    Marital status: Single     Spouse name: None    Number of children: None    Years of education: None    Highest education level: None   Occupational History    None   Tobacco Use    Smoking status: Former     Types: Cigarettes    Smokeless tobacco: Never   Vaping Use    Vaping status: None   Substance and Sexual Activity    Alcohol use: Not Currently    Drug use: Not Currently    Sexual activity: None   Other Topics Concern    None   Social History Narrative    None     Social Determinants of Health     Financial Resource Strain: Not on file   Food Insecurity: Not on file   Transportation Needs: Not on file   Physical Activity: Not on file   Stress: Not on file   Social Connections: Not on file   Intimate Partner Violence: Not on file   Housing Stability: Not on file     History reviewed. No pertinent past medical history.  History reviewed. No pertinent surgical history.  * Cannot find OR log *    Charting was completed using voice recognition technology and may include unintended errors.

## 2023-06-06 ENCOUNTER — OFFICE VISIT (OUTPATIENT)
Dept: PRIMARY CARE | Facility: CLINIC | Age: 33
End: 2023-06-06
Payer: COMMERCIAL

## 2023-06-06 VITALS
SYSTOLIC BLOOD PRESSURE: 142 MMHG | BODY MASS INDEX: 39.28 KG/M2 | WEIGHT: 290 LBS | HEIGHT: 72 IN | DIASTOLIC BLOOD PRESSURE: 82 MMHG | HEART RATE: 90 BPM | OXYGEN SATURATION: 95 %

## 2023-06-06 DIAGNOSIS — F32.A ANXIETY AND DEPRESSION: ICD-10-CM

## 2023-06-06 DIAGNOSIS — J21.9 ACUTE BRONCHIOLITIS DUE TO UNSPECIFIED ORGANISM: ICD-10-CM

## 2023-06-06 DIAGNOSIS — E55.9 VITAMIN D DEFICIENCY: ICD-10-CM

## 2023-06-06 DIAGNOSIS — M62.838 MUSCLE SPASM: ICD-10-CM

## 2023-06-06 DIAGNOSIS — Z13.9 SCREENING FOR CONDITION: ICD-10-CM

## 2023-06-06 DIAGNOSIS — G82.20 PARAPLEGIA (MULTI): Primary | ICD-10-CM

## 2023-06-06 DIAGNOSIS — Z79.4 TYPE 2 DIABETES MELLITUS WITHOUT COMPLICATION, WITH LONG-TERM CURRENT USE OF INSULIN (MULTI): ICD-10-CM

## 2023-06-06 DIAGNOSIS — Z11.59 NEED FOR HEPATITIS C SCREENING TEST: ICD-10-CM

## 2023-06-06 DIAGNOSIS — E11.9 TYPE 2 DIABETES MELLITUS WITHOUT COMPLICATION, WITH LONG-TERM CURRENT USE OF INSULIN (MULTI): ICD-10-CM

## 2023-06-06 DIAGNOSIS — D64.9 ANEMIA, UNSPECIFIED TYPE: ICD-10-CM

## 2023-06-06 DIAGNOSIS — S14.109D: ICD-10-CM

## 2023-06-06 DIAGNOSIS — F41.9 ANXIETY AND DEPRESSION: ICD-10-CM

## 2023-06-06 DIAGNOSIS — Z79.899 DRUG THERAPY: ICD-10-CM

## 2023-06-06 PROCEDURE — 99215 OFFICE O/P EST HI 40 MIN: CPT | Performed by: FAMILY MEDICINE

## 2023-06-06 PROCEDURE — 3077F SYST BP >= 140 MM HG: CPT | Performed by: FAMILY MEDICINE

## 2023-06-06 PROCEDURE — 3079F DIAST BP 80-89 MM HG: CPT | Performed by: FAMILY MEDICINE

## 2023-06-06 PROCEDURE — 99417 PROLNG OP E/M EACH 15 MIN: CPT | Performed by: FAMILY MEDICINE

## 2023-06-06 RX ORDER — ADHESIVE BANDAGE
BANDAGE TOPICAL DAILY PRN
COMMUNITY

## 2023-06-06 RX ORDER — BACLOFEN 10 MG/1
TABLET ORAL
Qty: 120 TABLET | Refills: 1 | Status: SHIPPED | OUTPATIENT
Start: 2023-06-06

## 2023-06-06 RX ORDER — DOXYCYCLINE 100 MG/1
CAPSULE ORAL
Qty: 20 CAPSULE | Refills: 0 | Status: SHIPPED | OUTPATIENT
Start: 2023-06-06

## 2023-06-06 RX ORDER — DOCUSATE SODIUM 100 MG/1
100 CAPSULE, LIQUID FILLED ORAL 2 TIMES DAILY
COMMUNITY

## 2023-06-06 RX ORDER — INSULIN GLARGINE 100 [IU]/ML
INJECTION, SOLUTION SUBCUTANEOUS
COMMUNITY
End: 2023-07-21 | Stop reason: SDUPTHER

## 2023-06-06 RX ORDER — SENNOSIDES 8.6 MG/1
1 TABLET ORAL DAILY
COMMUNITY

## 2023-06-06 RX ORDER — GUAIFENESIN 600 MG/1
1200 TABLET, EXTENDED RELEASE ORAL 2 TIMES DAILY
Status: ON HOLD | COMMUNITY
End: 2024-04-10 | Stop reason: WASHOUT

## 2023-06-06 RX ORDER — NALOXONE HYDROCHLORIDE 4 MG/.1ML
4 SPRAY NASAL AS NEEDED
COMMUNITY

## 2023-06-06 RX ORDER — AMLODIPINE BESYLATE 5 MG/1
TABLET ORAL DAILY
COMMUNITY

## 2023-06-06 RX ORDER — BISACODYL 10 MG/1
SUPPOSITORY RECTAL
COMMUNITY

## 2023-06-06 RX ORDER — FERROUS SULFATE 325(65) MG
65 TABLET ORAL
COMMUNITY

## 2023-06-06 RX ORDER — ALBUTEROL SULFATE 90 UG/1
AEROSOL, METERED RESPIRATORY (INHALATION)
Qty: 8.5 G | Refills: 1 | Status: SHIPPED | OUTPATIENT
Start: 2023-06-06

## 2023-06-06 RX ORDER — ACETAMINOPHEN 325 MG/1
TABLET ORAL EVERY 6 HOURS PRN
COMMUNITY

## 2023-06-06 RX ORDER — MULTIVIT-MIN/IRON FUM/FOLIC AC 7.5 MG-4
1 TABLET ORAL DAILY
Status: ON HOLD | COMMUNITY
End: 2024-04-10 | Stop reason: WASHOUT

## 2023-06-06 RX ORDER — GLIPIZIDE 5 MG/1
5 TABLET ORAL
Status: ON HOLD | COMMUNITY
End: 2024-04-10 | Stop reason: WASHOUT

## 2023-06-06 RX ORDER — OMEPRAZOLE 20 MG/1
20 CAPSULE, DELAYED RELEASE ORAL
COMMUNITY

## 2023-06-06 RX ORDER — IBUPROFEN 200 MG
200 TABLET ORAL EVERY 6 HOURS
COMMUNITY

## 2023-06-06 RX ORDER — INSULIN LISPRO 100 [IU]/ML
INJECTION, SUSPENSION SUBCUTANEOUS
COMMUNITY

## 2023-06-06 RX ORDER — BACLOFEN 20 MG
TABLET ORAL
Qty: 30 TABLET | Refills: 1 | Status: SHIPPED | OUTPATIENT
Start: 2023-06-06

## 2023-06-06 RX ORDER — GABAPENTIN 800 MG/1
TABLET ORAL
COMMUNITY

## 2023-06-06 RX ORDER — METOPROLOL TARTRATE 50 MG/1
TABLET ORAL
COMMUNITY

## 2023-06-06 RX ORDER — BACLOFEN 5 MG/1
5 TABLET ORAL 3 TIMES DAILY
COMMUNITY
End: 2023-06-06 | Stop reason: SDUPTHER

## 2023-06-06 RX ORDER — METFORMIN HYDROCHLORIDE 1000 MG/1
1000 TABLET ORAL
Status: ON HOLD | COMMUNITY
End: 2024-04-10 | Stop reason: WASHOUT

## 2023-06-06 RX ORDER — BUSPIRONE HYDROCHLORIDE 10 MG/1
TABLET ORAL
Qty: 60 TABLET | Refills: 1 | Status: SHIPPED | OUTPATIENT
Start: 2023-06-06

## 2023-06-06 RX ORDER — ESCITALOPRAM OXALATE 10 MG/1
TABLET ORAL
Qty: 30 TABLET | Refills: 1 | Status: SHIPPED | OUTPATIENT
Start: 2023-06-06

## 2023-06-06 RX ORDER — CALCIUM CARBONATE 200(500)MG
1 TABLET,CHEWABLE ORAL DAILY
COMMUNITY

## 2023-06-06 RX ORDER — TRAMADOL HYDROCHLORIDE 50 MG/1
TABLET ORAL
Status: ON HOLD | COMMUNITY
End: 2024-04-10 | Stop reason: WASHOUT

## 2023-06-06 NOTE — PROGRESS NOTES
Subjective   Patient ID: Ludin Levi is a 33 y.o. male who presents for Establish Care (Pt in today to establish care to discuss muscle spasms / PTSD / muscle spasm / prescriptions / sleep study / needs DexCom).    Review of Systems  Positive for N/V/D, no HA/S/V, denies rashes/lesions, no CP. Positive for fevers/chills with shortness of breath.  All other systems were negative.    Objective   Physical Exam  Gen: NAD  eyes: EOMI, PERRLA  ENT: hearing grossly intact, no nasal discharge  resp: CTABL, without R/R  heart: RRR without MRG  GI: abd: S/ND/NT, BS+  lymph: no axillary, cervical, supraclavicular lymphadenopathy noted   MS: pt in WC  derm: no rashes or lesions noted  neuro: CN II-XII grossly intact  psych: A&Ox3    Assessment/Plan   Diagnoses and all orders for this visit:  Paraplegia (CMS/[facility])  -     Shower chair  -     Elevated toilet seat  Cervical spinal cord injury, subsequent encounter (CMS/[facility])  -     Shower chair  -     Elevated toilet seat  Anxiety and depression  -     escitalopram (Lexapro) 10 mg tablet; One half by mouth daily for the first 6 days, thereafter 1 by mouth daily.  -     busPIRone (Buspar) 10 mg tablet; One half by mouth at bedtime for the first 2 days, one half by mouth twice daily for the next 2 days, thereafter 1 by mouth twice daily.  Muscle spasm  -     baclofen (Lioresal) 10 mg tablet; 1 by mouth up to 4 times daily as needed for muscle spasm pain.  -     magnesium oxide 500 mg tablet; 1 by mouth daily to help lessen muscle spasms and/or headaches.  Type 2 diabetes mellitus without complication, with long-term current use of insulin (CMS/[facility])  Anemia, unspecified type  -     Ferritin; Future  -     Iron and TIBC; Future  -     CBC; Future  -     Vitamin B12; Future  -     Folate; Future  Drug therapy  -     CBC and Auto Differential; Future  -     Comprehensive Metabolic Panel; Future  -     Magnesium; Future  -     Urinalysis with Reflex Microscopic; Future  Screening for  condition  -     TSH with reflex to Free T4 if abnormal; Future  -     Lipid Panel; Future  -     Hemoglobin A1C; Future  Need for hepatitis C screening test  -     Hepatitis C antibody; Future  Vitamin D deficiency  -     Vitamin D 1,25 Dihydroxy; Future  Acute bronchiolitis due to unspecified organism  -     doxycycline (Monodox) 100 mg capsule; 1 by mouth twice daily with food for 10 days  -     albuterol (ProAir HFA) 90 mcg/actuation inhaler; Inhale 2 puffs every 4-6 hours as needed for shortness of breath or wheezing.           Patient seen today with his friend, Shantelle.    Cute bronchitis, has had this about a week, is using Mucinex and drinking plenty of water. -->>  Continue Mucinex.  Will prescribe doxycycline for 10 days along with an albuterol inhaler.    Muscle spasms.  Gets them most nights.  Already on baclofen 5 mg 3 times daily.  Which seems to work better than Flexeril/cyclobenzaprine that he used to be on.  Still not really doing well enough. -->>  We will start on magnesium oxide 500 mg daily.  We will increase the baclofen to 10 mg 3 times daily as needed.    Straight caths due to neurogenic bladder secondary to spinal injury. -->> will order 16-gauge straight cath.    Type 2 diabetes.  Last A1c noted on chart was 7.5. -->>  We will check with labs before next appointment.  Ordering a G7 Dexcom today.    Depression with anxiety, anxiety may be the more substantial problem but does get sad at times.  Has been on trazodone the last couple of nights to help with sleep as well as muscle spasm pain.  Has not been on any SSRIs in the past. -->> Start on escitalopram 10 mg daily and BuSpar 10 mg twice daily.    History of microcytosis, hypochromia, and anemia.      - Patient will return in about 4 weeks for annual lab review and annual wellness visit.  - Patient will return here about a week before the appointment to get fasting annual labs including a hepatitis C screening and an anemia panel.

## 2023-06-06 NOTE — PATIENT INSTRUCTIONS
Patient seen today with his friend, Shantelle.    Cute bronchitis, has had this about a week, is using Mucinex and drinking plenty of water. -->>  Continue Mucinex.  Will prescribe doxycycline for 10 days along with an albuterol inhaler.    Muscle spasms.  Gets them most nights.  Already on baclofen 5 mg 3 times daily.  Which seems to work better than Flexeril/cyclobenzaprine that he used to be on.  Still not really doing well enough. -->>  We will start on magnesium oxide 500 mg daily.  We will increase the baclofen to 10 mg 3 times daily as needed.    Straight caths due to neurogenic bladder secondary to spinal injury. -->> will order 16-gauge straight cath.    Type 2 diabetes.  Last A1c noted on chart was 7.5. -->>  We will check with labs before next appointment.  Ordering a G7 Dexcom today.    Depression with anxiety, anxiety may be the more substantial problem but does get sad at times.  Has been on trazodone the last couple of nights to help with sleep as well as muscle spasm pain.  Has not been on any SSRIs in the past. -->> Start on escitalopram 10 mg daily and BuSpar 10 mg twice daily.    History of microcytosis, hypochromia, and anemia.      - Patient will return in about 4 weeks for annual lab review and annual wellness visit.  - Patient will return here about a week before the appointment to get fasting annual labs including a hepatitis C screening and an anemia panel.

## 2023-06-07 ENCOUNTER — TELEPHONE (OUTPATIENT)
Dept: PRIMARY CARE | Facility: CLINIC | Age: 33
End: 2023-06-07
Payer: COMMERCIAL

## 2023-06-07 DIAGNOSIS — Z79.4 TYPE 2 DIABETES MELLITUS WITHOUT COMPLICATION, WITH LONG-TERM CURRENT USE OF INSULIN (MULTI): Primary | ICD-10-CM

## 2023-06-07 DIAGNOSIS — E11.9 TYPE 2 DIABETES MELLITUS WITHOUT COMPLICATION, WITH LONG-TERM CURRENT USE OF INSULIN (MULTI): Primary | ICD-10-CM

## 2023-06-16 RX ORDER — BLOOD-GLUCOSE SENSOR
EACH MISCELLANEOUS
Qty: 2 EACH | Refills: 11 | Status: SHIPPED | OUTPATIENT
Start: 2023-06-16

## 2023-06-22 DIAGNOSIS — Z79.4 TYPE 2 DIABETES MELLITUS WITHOUT COMPLICATION, WITH LONG-TERM CURRENT USE OF INSULIN (MULTI): Primary | ICD-10-CM

## 2023-06-22 DIAGNOSIS — E11.9 TYPE 2 DIABETES MELLITUS WITHOUT COMPLICATION, WITH LONG-TERM CURRENT USE OF INSULIN (MULTI): Primary | ICD-10-CM

## 2023-06-23 RX ORDER — PEN NEEDLE, DIABETIC, SAFETY 30 GX3/16"
NEEDLE, DISPOSABLE MISCELLANEOUS
COMMUNITY
Start: 2023-05-30 | End: 2023-06-23 | Stop reason: SDUPTHER

## 2023-06-24 RX ORDER — PEN NEEDLE, DIABETIC, SAFETY 30 GX3/16"
NEEDLE, DISPOSABLE MISCELLANEOUS
Qty: 200 EACH | Refills: 3 | Status: SHIPPED | OUTPATIENT
Start: 2023-06-24

## 2023-06-24 RX ORDER — BLOOD-GLUCOSE METER
EACH MISCELLANEOUS
Qty: 100 STRIP | Refills: 0 | Status: SHIPPED | OUTPATIENT
Start: 2023-06-24

## 2023-07-05 ENCOUNTER — APPOINTMENT (OUTPATIENT)
Dept: PRIMARY CARE | Facility: CLINIC | Age: 33
End: 2023-07-05
Payer: COMMERCIAL

## 2023-07-21 DIAGNOSIS — Z79.4 TYPE 2 DIABETES MELLITUS WITHOUT COMPLICATION, WITH LONG-TERM CURRENT USE OF INSULIN (MULTI): Primary | ICD-10-CM

## 2023-07-21 DIAGNOSIS — E11.9 TYPE 2 DIABETES MELLITUS WITHOUT COMPLICATION, WITH LONG-TERM CURRENT USE OF INSULIN (MULTI): Primary | ICD-10-CM

## 2023-07-21 RX ORDER — INSULIN GLARGINE 100 [IU]/ML
INJECTION, SOLUTION SUBCUTANEOUS
Qty: 10 ML | Refills: 0 | Status: SHIPPED | OUTPATIENT
Start: 2023-07-21

## 2023-07-21 NOTE — TELEPHONE ENCOUNTER
Pt was supposed to have labs and follow up, apparently noshowed twice in the last few weeks. Will not treat patient if he noshows for appointments and fails to get his labs.    Needs labs and followup appt scheduled, please and thanks.

## 2024-04-03 ENCOUNTER — HOSPITAL ENCOUNTER (EMERGENCY)
Facility: HOSPITAL | Age: 34
Discharge: HOME | End: 2024-04-03
Attending: EMERGENCY MEDICINE
Payer: MEDICARE

## 2024-04-03 ENCOUNTER — APPOINTMENT (OUTPATIENT)
Dept: RADIOLOGY | Facility: HOSPITAL | Age: 34
End: 2024-04-03
Payer: MEDICARE

## 2024-04-03 VITALS
TEMPERATURE: 97.7 F | SYSTOLIC BLOOD PRESSURE: 181 MMHG | HEART RATE: 117 BPM | OXYGEN SATURATION: 94 % | BODY MASS INDEX: 39.14 KG/M2 | WEIGHT: 289 LBS | DIASTOLIC BLOOD PRESSURE: 92 MMHG | HEIGHT: 72 IN | RESPIRATION RATE: 20 BRPM

## 2024-04-03 DIAGNOSIS — S82.252A CLOSED DISPLACED COMMINUTED FRACTURE OF SHAFT OF LEFT TIBIA, INITIAL ENCOUNTER: Primary | ICD-10-CM

## 2024-04-03 DIAGNOSIS — S82.452A CLOSED DISPLACED COMMINUTED FRACTURE OF SHAFT OF LEFT FIBULA, INITIAL ENCOUNTER: ICD-10-CM

## 2024-04-03 PROCEDURE — 73590 X-RAY EXAM OF LOWER LEG: CPT | Mod: LT

## 2024-04-03 PROCEDURE — 73700 CT LOWER EXTREMITY W/O DYE: CPT | Mod: LEFT SIDE | Performed by: STUDENT IN AN ORGANIZED HEALTH CARE EDUCATION/TRAINING PROGRAM

## 2024-04-03 PROCEDURE — 29515 APPLICATION SHORT LEG SPLINT: CPT | Mod: LT

## 2024-04-03 PROCEDURE — 73700 CT LOWER EXTREMITY W/O DYE: CPT | Mod: LT

## 2024-04-03 PROCEDURE — 99285 EMERGENCY DEPT VISIT HI MDM: CPT | Performed by: EMERGENCY MEDICINE

## 2024-04-03 PROCEDURE — 73590 X-RAY EXAM OF LOWER LEG: CPT | Mod: LEFT SIDE | Performed by: RADIOLOGY

## 2024-04-03 PROCEDURE — 99284 EMERGENCY DEPT VISIT MOD MDM: CPT | Mod: 25

## 2024-04-03 RX ORDER — KETOROLAC TROMETHAMINE 30 MG/ML
30 INJECTION, SOLUTION INTRAMUSCULAR; INTRAVENOUS ONCE
Status: DISCONTINUED | OUTPATIENT
Start: 2024-04-03 | End: 2024-04-03 | Stop reason: HOSPADM

## 2024-04-03 RX ORDER — KETOROLAC TROMETHAMINE 10 MG/1
10 TABLET, FILM COATED ORAL EVERY 6 HOURS PRN
Qty: 16 TABLET | Refills: 0 | Status: SHIPPED | OUTPATIENT
Start: 2024-04-03 | End: 2024-04-07

## 2024-04-03 ASSESSMENT — LIFESTYLE VARIABLES
HAVE PEOPLE ANNOYED YOU BY CRITICIZING YOUR DRINKING: NO
EVER FELT BAD OR GUILTY ABOUT YOUR DRINKING: NO
HAVE YOU EVER FELT YOU SHOULD CUT DOWN ON YOUR DRINKING: NO
EVER HAD A DRINK FIRST THING IN THE MORNING TO STEADY YOUR NERVES TO GET RID OF A HANGOVER: NO
TOTAL SCORE: 0

## 2024-04-03 ASSESSMENT — COLUMBIA-SUICIDE SEVERITY RATING SCALE - C-SSRS
2. HAVE YOU ACTUALLY HAD ANY THOUGHTS OF KILLING YOURSELF?: NO
1. IN THE PAST MONTH, HAVE YOU WISHED YOU WERE DEAD OR WISHED YOU COULD GO TO SLEEP AND NOT WAKE UP?: NO
6. HAVE YOU EVER DONE ANYTHING, STARTED TO DO ANYTHING, OR PREPARED TO DO ANYTHING TO END YOUR LIFE?: NO

## 2024-04-03 ASSESSMENT — PAIN - FUNCTIONAL ASSESSMENT: PAIN_FUNCTIONAL_ASSESSMENT: 0-10

## 2024-04-03 ASSESSMENT — PAIN SCALES - GENERAL: PAINLEVEL_OUTOF10: 5 - MODERATE PAIN
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
## 2024-04-03 NOTE — Clinical Note
Shantelle Murcia accompanied Tolu Levi to the emergency department on 4/3/2024. They may return to work on 04/05/2024.      If you have any questions or concerns, please don't hesitate to call.      Verito Weinstein, DO

## 2024-04-03 NOTE — Clinical Note
adrian maki accompanied Tolu Levi to the emergency department on 4/3/2024. They may return to work on 04/05/2024.      If you have any questions or concerns, please don't hesitate to call.      Verito Weinstein, DO

## 2024-04-04 ENCOUNTER — TELEPHONE (OUTPATIENT)
Dept: PRIMARY CARE | Facility: CLINIC | Age: 34
End: 2024-04-04
Payer: MEDICARE

## 2024-04-04 NOTE — DISCHARGE INSTRUCTIONS
Follow-up with Dr. Rene at his [place] office tomorrow.  You can call his office in the morning to figure out what time is best to come in.    Being mindful of the splint.  If you start to notice no color to your toes please return to the ED for reevaluation.    If you take the Toradol please do not take ibuprofen, Aleve, Advil.

## 2024-04-10 ENCOUNTER — ANESTHESIA (OUTPATIENT)
Dept: OPERATING ROOM | Facility: HOSPITAL | Age: 34
End: 2024-04-10
Payer: MEDICARE

## 2024-04-10 ENCOUNTER — HOSPITAL ENCOUNTER (OUTPATIENT)
Facility: HOSPITAL | Age: 34
Setting detail: OUTPATIENT SURGERY
Discharge: HOME | End: 2024-04-10
Attending: ORTHOPAEDIC SURGERY | Admitting: ORTHOPAEDIC SURGERY
Payer: MEDICARE

## 2024-04-10 ENCOUNTER — ANESTHESIA EVENT (OUTPATIENT)
Dept: OPERATING ROOM | Facility: HOSPITAL | Age: 34
End: 2024-04-10
Payer: MEDICARE

## 2024-04-10 VITALS
HEART RATE: 99 BPM | SYSTOLIC BLOOD PRESSURE: 174 MMHG | HEIGHT: 72 IN | RESPIRATION RATE: 18 BRPM | TEMPERATURE: 97.2 F | DIASTOLIC BLOOD PRESSURE: 96 MMHG | BODY MASS INDEX: 40.63 KG/M2 | OXYGEN SATURATION: 97 % | WEIGHT: 300 LBS

## 2024-04-10 DIAGNOSIS — S82.402A TIBIA/FIBULA FRACTURE, LEFT, CLOSED, INITIAL ENCOUNTER: Primary | ICD-10-CM

## 2024-04-10 DIAGNOSIS — S82.202A TIBIA/FIBULA FRACTURE, LEFT, CLOSED, INITIAL ENCOUNTER: Primary | ICD-10-CM

## 2024-04-10 LAB — GLUCOSE BLD MANUAL STRIP-MCNC: 142 MG/DL (ref 74–99)

## 2024-04-10 PROCEDURE — 82947 ASSAY GLUCOSE BLOOD QUANT: CPT

## 2024-04-10 RX ORDER — MIDAZOLAM HYDROCHLORIDE 1 MG/ML
1 INJECTION, SOLUTION INTRAMUSCULAR; INTRAVENOUS ONCE AS NEEDED
Status: CANCELLED | OUTPATIENT
Start: 2024-04-10

## 2024-04-10 RX ORDER — METOCLOPRAMIDE HYDROCHLORIDE 5 MG/ML
10 INJECTION INTRAMUSCULAR; INTRAVENOUS ONCE AS NEEDED
Status: CANCELLED | OUTPATIENT
Start: 2024-04-10

## 2024-04-10 RX ORDER — MIDAZOLAM HYDROCHLORIDE 1 MG/ML
INJECTION, SOLUTION INTRAMUSCULAR; INTRAVENOUS CONTINUOUS PRN
Status: DISCONTINUED | OUTPATIENT
Start: 2024-04-10 | End: 2024-04-11 | Stop reason: HOSPADM

## 2024-04-10 RX ORDER — PROPOFOL 10 MG/ML
INJECTION, EMULSION INTRAVENOUS CONTINUOUS PRN
Status: DISCONTINUED | OUTPATIENT
Start: 2024-04-10 | End: 2024-04-11 | Stop reason: HOSPADM

## 2024-04-10 RX ORDER — DIPHENHYDRAMINE HYDROCHLORIDE 50 MG/ML
12.5 INJECTION INTRAMUSCULAR; INTRAVENOUS ONCE AS NEEDED
Status: CANCELLED | OUTPATIENT
Start: 2024-04-10

## 2024-04-10 RX ORDER — HYDROCODONE BITARTRATE AND ACETAMINOPHEN 5; 325 MG/1; MG/1
1 TABLET ORAL EVERY 4 HOURS PRN
Status: CANCELLED | OUTPATIENT
Start: 2024-04-10

## 2024-04-10 RX ORDER — TIRZEPATIDE 2.5 MG/.5ML
2.5 INJECTION, SOLUTION SUBCUTANEOUS
COMMUNITY

## 2024-04-10 RX ORDER — FENTANYL CITRATE 50 UG/ML
INJECTION, SOLUTION INTRAMUSCULAR; INTRAVENOUS CONTINUOUS PRN
Status: DISCONTINUED | OUTPATIENT
Start: 2024-04-10 | End: 2024-04-11 | Stop reason: HOSPADM

## 2024-04-10 RX ORDER — LABETALOL HYDROCHLORIDE 5 MG/ML
5 INJECTION, SOLUTION INTRAVENOUS
Status: CANCELLED | OUTPATIENT
Start: 2024-04-10

## 2024-04-10 RX ORDER — SODIUM CHLORIDE, SODIUM LACTATE, POTASSIUM CHLORIDE, CALCIUM CHLORIDE 600; 310; 30; 20 MG/100ML; MG/100ML; MG/100ML; MG/100ML
100 INJECTION, SOLUTION INTRAVENOUS CONTINUOUS
Status: CANCELLED | OUTPATIENT
Start: 2024-04-10

## 2024-04-10 RX ORDER — HYDROCODONE BITARTRATE AND ACETAMINOPHEN 5; 325 MG/1; MG/1
1 TABLET ORAL EVERY 4 HOURS PRN
Qty: 42 TABLET | Refills: 0 | Status: SHIPPED | OUTPATIENT
Start: 2024-04-10 | End: 2024-04-17

## 2024-04-10 RX ORDER — HYDRALAZINE HYDROCHLORIDE 20 MG/ML
5 INJECTION INTRAMUSCULAR; INTRAVENOUS EVERY 30 MIN PRN
Status: CANCELLED | OUTPATIENT
Start: 2024-04-10

## 2024-04-10 RX ORDER — HYDROMORPHONE HYDROCHLORIDE 1 MG/ML
1 INJECTION, SOLUTION INTRAMUSCULAR; INTRAVENOUS; SUBCUTANEOUS EVERY 5 MIN PRN
Status: CANCELLED | OUTPATIENT
Start: 2024-04-10

## 2024-04-10 RX ORDER — ALBUTEROL SULFATE 0.83 MG/ML
2.5 SOLUTION RESPIRATORY (INHALATION)
Status: CANCELLED | OUTPATIENT
Start: 2024-04-10

## 2024-04-10 ASSESSMENT — COLUMBIA-SUICIDE SEVERITY RATING SCALE - C-SSRS
6. HAVE YOU EVER DONE ANYTHING, STARTED TO DO ANYTHING, OR PREPARED TO DO ANYTHING TO END YOUR LIFE?: NO
1. IN THE PAST MONTH, HAVE YOU WISHED YOU WERE DEAD OR WISHED YOU COULD GO TO SLEEP AND NOT WAKE UP?: NO
2. HAVE YOU ACTUALLY HAD ANY THOUGHTS OF KILLING YOURSELF?: NO

## 2024-04-10 ASSESSMENT — PAIN DESCRIPTION - DESCRIPTORS: DESCRIPTORS: THROBBING

## 2024-04-10 ASSESSMENT — PAIN SCALES - GENERAL: PAINLEVEL_OUTOF10: 7

## 2024-04-10 ASSESSMENT — PAIN - FUNCTIONAL ASSESSMENT: PAIN_FUNCTIONAL_ASSESSMENT: 0-10

## 2024-04-10 NOTE — ANESTHESIA PREPROCEDURE EVALUATION
Patient: Tolu Levi    Procedure Information       Date/Time: 04/10/24 1235    Procedure: IM nail left tibia fracture and OPEN REDUCTION INTERNAL FIXATION of left medial malleolus fracture (Left: Knee)    Location: [facility] / [facility]    Surgeons: David Rene MD            Relevant Problems   Cardiac   (+) DM type 2 with diabetic mixed hyperlipidemia (CMS/HCC)      Neuro   (+) Recurrent major depressive disorder, in partial remission (CMS/HCC)      GI   (+) GERD without esophagitis      /Renal   (+) Acute cystitis without hematuria   (+) Catheter-associated urinary tract infection (CMS/HCC)      ID   (+) Catheter-associated urinary tract infection (CMS/HCC)       Clinical information reviewed:   Tobacco  Allergies  Meds   Med Hx  Surg Hx   Fam Hx  Soc Hx        NPO Detail:  NPO/Void Status  Date of Last Liquid: 04/10/24  Time of Last Liquid: 0800  Date of Last Solid: 04/09/24  Time of Last Solid: 2340         Physical Exam    Airway  Mallampati: III  TM distance: >3 FB  Neck ROM: limited  Comments: Thick short neck.  All notes regarding prior airway management have been reviewed. It appears that the largest diameter of the ETT used was 6.0  No further notes were found.     Cardiovascular - normal exam     Dental - normal exam     Pulmonary - normal exam     Abdominal - normal exam           Vitals:    04/10/24 1107   BP: (!) 174/96   Pulse: 99   Resp: 18   Temp: 36.2 °C (97.2 °F)   SpO2: 97%       Past Surgical History:   Procedure Laterality Date    APPENDECTOMY      LARYNX SURGERY      PANCREAS SURGERY       Past Medical History:   Diagnosis Date    Anxiety     Diabetes mellitus (CMS/HCC)     Muscle spasm     Paraplegic gait     PTSD (post-traumatic stress disorder)     Staph infection      No current facility-administered medications for this encounter.  Prior to Admission medications    Medication Sig Start Date End Date Taking? Authorizing Provider   acetaminophen (Tylenol) 325 mg tablet  "Take by mouth every 6 hours if needed for mild pain (1 - 3).   Yes Historical Provider, MD GUEVARA AutoShield Duo Pen Needle 30 gauge x 3/16\" needle Use as directed for injections up to 4 times daily 6/24/23  Yes Robert Vargas DO   bisacodyl (Dulcolax) 10 mg suppository Insert into the rectum.   Yes Historical Provider, MD   blood-glucose sensor (Dexcom G7 Sensor) device Apply every 2 weeks as directed 6/16/23  Yes Robert Vargas DO   busPIRone (Buspar) 10 mg tablet One half by mouth at bedtime for the first 2 days, one half by mouth twice daily for the next 2 days, thereafter 1 by mouth twice daily. 6/6/23  Yes Robert Vargas DO   calcium carbonate (Tums) 200 mg calcium chewable tablet Chew 1 tablet (500 mg) once daily.   Yes Historical Provider, MD   docusate sodium (Colace) 100 mg capsule Take 1 capsule (100 mg) by mouth 2 times a day.   Yes Historical Provider, MD   escitalopram (Lexapro) 10 mg tablet One half by mouth daily for the first 6 days, thereafter 1 by mouth daily. 6/6/23  Yes Robert Vargas DO   ferrous sulfate 325 (65 Fe) MG tablet Take 1 tablet by mouth once daily with breakfast.   Yes Historical Provider, MD   ibuprofen 200 mg tablet Take 1 tablet (200 mg) by mouth every 6 hours.   Yes Historical Provider, MD   insulin glargine (Lantus U-100 Insulin) 100 unit/mL injection Take as directed per insulin instructions. 7/21/23  Yes Robert Vargas DO   insulin lispro protamin-lispro (HumaLOG Mix 50-50 KwikPen) 100 unit/mL (50-50) injection Inject under the skin 2 times a day with meals. Take as directed per insulin instructions.   Yes Historical Provider, MD   magnesium hydroxide (Milk of Magnesia) 400 mg/5 mL suspension Take by mouth once daily as needed for constipation.   Yes Historical Provider, MD   magnesium oxide 500 mg tablet 1 by mouth daily to help lessen muscle spasms and/or headaches. 6/6/23  Yes Robert Vargas DO   albuterol (ProAir HFA) 90 mcg/actuation inhaler Inhale 2 " puffs every 4-6 hours as needed for shortness of breath or wheezing. 6/6/23   Robert Vargas DO   amLODIPine (Norvasc) 5 mg tablet Take by mouth once daily.    Historical Provider, MD   baclofen (Lioresal) 10 mg tablet 1 by mouth up to 4 times daily as needed for muscle spasm pain.  Patient not taking: Reported on 4/10/2024 6/6/23   Robert Vargas DO   benzocaine (Orajel) 10 % mucosal gel Use in the mouth or throat if needed.    Historical Provider, MD   doxycycline (Monodox) 100 mg capsule 1 by mouth twice daily with food for 10 days  Patient not taking: Reported on 4/10/2024 6/6/23   Robert Vargas DO   Fish OiL 60- mg capsule Take 1 capsule (500 mg) by mouth once daily.    Historical Provider, MD   gabapentin (Neurontin) 800 mg tablet Take by mouth.    Historical Provider, MD   ketorolac (Toradol) 10 mg tablet Take 1 tablet (10 mg) by mouth every 6 hours if needed for moderate pain (4 - 6) for up to 4 days. 4/3/24 4/7/24  Verito Weinstein,    metoprolol tartrate (Lopressor) 50 mg tablet Take by mouth.    Historical Provider, MD   naloxone (Narcan) 4 mg/0.1 mL nasal spray Administer 1 spray (4 mg) into affected nostril(s) if needed for opioid reversal. May repeat every 2-3 minutes if needed, alternating nostrils, until medical assistance becomes available.    Historical Provider, MD   omeprazole (PriLOSEC) 20 mg DR capsule Take 1 capsule (20 mg) by mouth. Do not crush or chew.    Historical Provider, MD   OneTouch Verio test strips strip TEST FIVE TIMES A DAY (E11.9 IDDM)  Patient not taking: Reported on 4/10/2024 6/24/23   Robert Vargas DO   sennosides (Senokot) 8.6 mg tablet Take 1 tablet (8.6 mg) by mouth once daily.    Historical Provider, MD   tirzepatide (Mounjaro) 2.5 mg/0.5 mL pen injector Inject 2.5 mg under the skin every 7 days. Takes on Saturdays    Historical Provider, MD   glipiZIDE (Glucotrol) 5 mg tablet Take 1 tablet (5 mg) by mouth 2 times a day before meals.  4/10/24   "Historical Provider, MD   guaiFENesin (Mucinex) 600 mg 12 hr tablet Take 2 tablets (1,200 mg) by mouth 2 times a day. Do not crush, chew, or split.  4/10/24  Historical Provider, MD   metFORMIN (Glucophage) 1,000 mg tablet Take 1 tablet (1,000 mg) by mouth 2 times a day with meals.  4/10/24  Historical Provider, MD   multivitamin with minerals (multivit-min-iron fum-folic ac) tablet Take 1 tablet by mouth once daily.  4/10/24  Historical Provider, MD   sodium phosphates (Fleet, Pediatric,) 9.5-3.5 gram/59 mL enema Insert into the rectum 1 time.  4/10/24  Historical Provider, MD   tetrahydrozoline 0.05 % ophthalmic solution 1 drop 3 times a day.  4/10/24  Historical Provider, MD   traMADol (Ultram) 50 mg tablet Take by mouth.  4/10/24  Historical Provider, MD     No Known Allergies  Social History     Tobacco Use    Smoking status: Every Day     Current packs/day: 0.50     Types: Cigarettes    Smokeless tobacco: Never   Substance Use Topics    Alcohol use: Never         Chemistry    Lab Results   Component Value Date/Time     11/09/2021 0740    K 4.2 11/09/2021 0740    CL 99 11/09/2021 0740    CO2 28 11/09/2021 0740    BUN 11 11/09/2021 0740    CREATININE 0.61 11/09/2021 0740    Lab Results   Component Value Date/Time    CALCIUM 9.3 11/09/2021 0740          Lab Results   Component Value Date/Time    WBC 6.4 11/09/2021 0740    HGB 14.1 11/09/2021 0740    HCT 45.4 11/09/2021 0740     11/09/2021 0740     No results found for: \"PROTIME\", \"PTT\", \"INR\"  No results found for this or any previous visit (from the past 4464 hour(s)).     Anesthesia Plan    History of general anesthesia?: yes  History of complications of general anesthesia?: no    ASA 3     general   (Will proceed with laryngoscopy with video laryngoscope blade, planning on 6.0 ETT. Will place LMA if above unsuccessful and reevaluate.)  Patient did not smoke on day of procedure.    intravenous induction   Anesthetic plan and risks discussed with " patient.    Plan discussed with YANICK.
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
## (undated) DEVICE — Device

## (undated) DEVICE — TOWEL PACK, STERILE, 4/PACK, BLUE

## (undated) DEVICE — TIP, SUCTION, YANKAUER, FLEXIBLE

## (undated) DEVICE — DRAPE, TIBURON, SPLIT SHEET, REINF ADH STRIP, 77X122

## (undated) DEVICE — SOLUTION, IRRIGATION, SODIUM CHLORIDE 0.9%, 1000 ML, POUR BOTTLE

## (undated) DEVICE — BANDAGE, COFLEX, 6 X 5 YDS, FOAM TAN, STERILE, LF

## (undated) DEVICE — SUTURE, PROLENE, 2-0, 30 IN, KS, BLUE

## (undated) DEVICE — COVER, TABLE, 54X90

## (undated) DEVICE — GLOVE, SURGICAL, PROTEXIS PI W/NEU-THERA, 8.0, PF, LF

## (undated) DEVICE — DRAPE, INCISE, ANTIMICROBIAL, IOBAN 2, STERI DRAPE, 23 X 33 IN, DISPOSABLE, STERILE

## (undated) DEVICE — DRAPE, C-ARM IMAGE

## (undated) DEVICE — APPLICATOR, CHLORAPREP, W/ORANGE TINT, 26ML

## (undated) DEVICE — SOLUTION, IRRIGATION, STERILE WATER, 1000 ML, POUR BOTTLE

## (undated) DEVICE — HIGH FLOW TIP FOR INTERPULSE HANDPIECE SET

## (undated) DEVICE — STAPLER, SKIN, PLUS, WIDE, 35